# Patient Record
Sex: MALE | Race: OTHER | URBAN - METROPOLITAN AREA
[De-identification: names, ages, dates, MRNs, and addresses within clinical notes are randomized per-mention and may not be internally consistent; named-entity substitution may affect disease eponyms.]

---

## 2019-06-21 ENCOUNTER — EMERGENCY (EMERGENCY)
Facility: HOSPITAL | Age: 39
LOS: 1 days | Discharge: HOME | End: 2019-06-21
Attending: EMERGENCY MEDICINE | Admitting: EMERGENCY MEDICINE
Payer: SELF-PAY

## 2019-06-21 VITALS
HEART RATE: 98 BPM | DIASTOLIC BLOOD PRESSURE: 60 MMHG | RESPIRATION RATE: 16 BRPM | TEMPERATURE: 98 F | SYSTOLIC BLOOD PRESSURE: 133 MMHG | OXYGEN SATURATION: 99 %

## 2019-06-21 VITALS
DIASTOLIC BLOOD PRESSURE: 80 MMHG | SYSTOLIC BLOOD PRESSURE: 112 MMHG | HEART RATE: 92 BPM | OXYGEN SATURATION: 97 % | RESPIRATION RATE: 16 BRPM

## 2019-06-21 LAB
ALBUMIN SERPL ELPH-MCNC: 3.6 G/DL — SIGNIFICANT CHANGE UP (ref 3.3–5)
ALP SERPL-CCNC: 73 U/L — SIGNIFICANT CHANGE UP (ref 40–120)
ALT FLD-CCNC: 30 U/L — SIGNIFICANT CHANGE UP (ref 4–41)
ANION GAP SERPL CALC-SCNC: 15 MMO/L — HIGH (ref 7–14)
APTT BLD: 31.3 SEC — SIGNIFICANT CHANGE UP (ref 27.5–36.3)
AST SERPL-CCNC: 40 U/L — SIGNIFICANT CHANGE UP (ref 4–40)
BASE EXCESS BLDV CALC-SCNC: 6.6 MMOL/L — SIGNIFICANT CHANGE UP
BASOPHILS # BLD AUTO: 0.06 K/UL — SIGNIFICANT CHANGE UP (ref 0–0.2)
BASOPHILS NFR BLD AUTO: 1 % — SIGNIFICANT CHANGE UP (ref 0–2)
BILIRUB SERPL-MCNC: 0.4 MG/DL — SIGNIFICANT CHANGE UP (ref 0.2–1.2)
BLD GP AB SCN SERPL QL: NEGATIVE — SIGNIFICANT CHANGE UP
BLOOD GAS VENOUS - CREATININE: 0.8 MG/DL — SIGNIFICANT CHANGE UP (ref 0.5–1.3)
BUN SERPL-MCNC: 7 MG/DL — SIGNIFICANT CHANGE UP (ref 7–23)
CALCIUM SERPL-MCNC: 8.7 MG/DL — SIGNIFICANT CHANGE UP (ref 8.4–10.5)
CHLORIDE BLDV-SCNC: 109 MMOL/L — HIGH (ref 96–108)
CHLORIDE SERPL-SCNC: 100 MMOL/L — SIGNIFICANT CHANGE UP (ref 98–107)
CO2 SERPL-SCNC: 27 MMOL/L — SIGNIFICANT CHANGE UP (ref 22–31)
CREAT SERPL-MCNC: 0.81 MG/DL — SIGNIFICANT CHANGE UP (ref 0.5–1.3)
EOSINOPHIL # BLD AUTO: 0.07 K/UL — SIGNIFICANT CHANGE UP (ref 0–0.5)
EOSINOPHIL NFR BLD AUTO: 1.1 % — SIGNIFICANT CHANGE UP (ref 0–6)
GAS PNL BLDV: 140 MMOL/L — SIGNIFICANT CHANGE UP (ref 136–146)
GLUCOSE BLDV-MCNC: 88 MG/DL — SIGNIFICANT CHANGE UP (ref 70–99)
GLUCOSE SERPL-MCNC: 88 MG/DL — SIGNIFICANT CHANGE UP (ref 70–99)
HCO3 BLDV-SCNC: 30 MMOL/L — HIGH (ref 20–27)
HCT VFR BLD CALC: 37.7 % — LOW (ref 39–50)
HCT VFR BLDV CALC: 38.9 % — LOW (ref 39–51)
HGB BLD-MCNC: 12.5 G/DL — LOW (ref 13–17)
HGB BLDV-MCNC: 12.6 G/DL — LOW (ref 13–17)
IMM GRANULOCYTES NFR BLD AUTO: 0.5 % — SIGNIFICANT CHANGE UP (ref 0–1.5)
INR BLD: 0.94 — SIGNIFICANT CHANGE UP (ref 0.88–1.17)
LACTATE BLDV-MCNC: 1.6 MMOL/L — SIGNIFICANT CHANGE UP (ref 0.5–2)
LYMPHOCYTES # BLD AUTO: 1.37 K/UL — SIGNIFICANT CHANGE UP (ref 1–3.3)
LYMPHOCYTES # BLD AUTO: 22.1 % — SIGNIFICANT CHANGE UP (ref 13–44)
MCHC RBC-ENTMCNC: 30.7 PG — SIGNIFICANT CHANGE UP (ref 27–34)
MCHC RBC-ENTMCNC: 33.2 % — SIGNIFICANT CHANGE UP (ref 32–36)
MCV RBC AUTO: 92.6 FL — SIGNIFICANT CHANGE UP (ref 80–100)
MONOCYTES # BLD AUTO: 0.79 K/UL — SIGNIFICANT CHANGE UP (ref 0–0.9)
MONOCYTES NFR BLD AUTO: 12.7 % — SIGNIFICANT CHANGE UP (ref 2–14)
NEUTROPHILS # BLD AUTO: 3.89 K/UL — SIGNIFICANT CHANGE UP (ref 1.8–7.4)
NEUTROPHILS NFR BLD AUTO: 62.6 % — SIGNIFICANT CHANGE UP (ref 43–77)
NRBC # FLD: 0 K/UL — SIGNIFICANT CHANGE UP (ref 0–0)
PCO2 BLDV: 47 MMHG — SIGNIFICANT CHANGE UP (ref 41–51)
PH BLDV: 7.43 PH — SIGNIFICANT CHANGE UP (ref 7.32–7.43)
PLATELET # BLD AUTO: 250 K/UL — SIGNIFICANT CHANGE UP (ref 150–400)
PMV BLD: 9.1 FL — SIGNIFICANT CHANGE UP (ref 7–13)
PO2 BLDV: 53 MMHG — HIGH (ref 35–40)
POTASSIUM BLDV-SCNC: 3.4 MMOL/L — SIGNIFICANT CHANGE UP (ref 3.4–4.5)
POTASSIUM SERPL-MCNC: 3.5 MMOL/L — SIGNIFICANT CHANGE UP (ref 3.5–5.3)
POTASSIUM SERPL-SCNC: 3.5 MMOL/L — SIGNIFICANT CHANGE UP (ref 3.5–5.3)
PROT SERPL-MCNC: 7.9 G/DL — SIGNIFICANT CHANGE UP (ref 6–8.3)
PROTHROM AB SERPL-ACNC: 10.4 SEC — SIGNIFICANT CHANGE UP (ref 9.8–13.1)
RBC # BLD: 4.07 M/UL — LOW (ref 4.2–5.8)
RBC # FLD: 14.4 % — SIGNIFICANT CHANGE UP (ref 10.3–14.5)
RH IG SCN BLD-IMP: POSITIVE — SIGNIFICANT CHANGE UP
SAO2 % BLDV: 83.3 % — SIGNIFICANT CHANGE UP (ref 60–85)
SODIUM SERPL-SCNC: 142 MMOL/L — SIGNIFICANT CHANGE UP (ref 135–145)
WBC # BLD: 6.21 K/UL — SIGNIFICANT CHANGE UP (ref 3.8–10.5)
WBC # FLD AUTO: 6.21 K/UL — SIGNIFICANT CHANGE UP (ref 3.8–10.5)

## 2019-06-21 PROCEDURE — 99283 EMERGENCY DEPT VISIT LOW MDM: CPT | Mod: 25

## 2019-06-21 PROCEDURE — 99053 MED SERV 10PM-8AM 24 HR FAC: CPT

## 2019-06-21 RX ORDER — AZTREONAM 2 G
1 VIAL (EA) INJECTION
Qty: 20 | Refills: 0
Start: 2019-06-21 | End: 2019-06-30

## 2019-06-21 RX ADMIN — Medication 50 MILLIGRAM(S): at 07:31

## 2019-06-21 NOTE — ED PROVIDER NOTE - CLINICAL SUMMARY MEDICAL DECISION MAKING FREE TEXT BOX
39M no pmh p/w likely orbital cellulitis. Will upload imaging, preoplabs. ENT/ophtho consult. Already given dose of abx. Admit

## 2019-06-21 NOTE — ED ADULT NURSE NOTE - OBJECTIVE STATEMENT
Pt received in  18, 39Y Male, Aox3, ambulatory, c/o Pt received in rm 18, 39Y Male, Aox3, ambulatory, c/o right eye swelling and pain. Pt received in ambay as a transfer from [facility]. Pt denies any pmhx or medications. Pt reports was outside and got bit by something on his right eye but cannot recall what it was, pt reports possibly a spider?. Pt reports hx of alcohol use. Pt reports drinks daily 2-3 beers and had last drink last night around 2pm. Pt reports no hx of hospitalization for alcohol withdrawal. Upon arrival to room pt refusing to change into hospital gown. Noted edema and redness to right eye. Pt denies any double vision, vision changes. Pt reports wears glasses at home and cannot read eye chart at this time because he does not have his glasses. Pt breathing even and unlabored, medicated as ordered, IV access obtained 20G Lft AC, labs sent as ordered, will continue to monitor. Pt received in rm 18, 39Y Male, Aox3, ambulatory, c/o right eye swelling and pain. Pt received in ambay as a transfer from [facility]. Pt denies any pmhx or medications. Pt reports was outside and got bit by something on his right eye but cannot recall what it was, pt reports possibly a spider?. Pt reports hx of alcohol use. Pt reports drinks daily 2-3 beers and had last drink last night around 2pm. Pt reports no hx of hospitalization for alcohol withdrawal. Upon arrival to room pt refusing to change into hospital gown. Noted IV access by [facility] 20G rt AC. Noted edema and redness to right eye. Pt denies any double vision, vision changes. Pt reports wears glasses at home and cannot read eye chart at this time because he does not have his glasses. Pt breathing even and unlabored, medicated as ordered, IV access obtained 20G Lft AC, labs sent as ordered, will continue to monitor.

## 2019-06-21 NOTE — ED ADULT NURSE REASSESSMENT NOTE - NS ED NURSE REASSESS COMMENT FT1
9:15AM pt walked out the hosp. with 2 IV in place, pt was aware that opthalmology  was called, security was called, pt returned.  IV's was removed.     Tianna Bhat RN

## 2019-06-21 NOTE — CONSULT NOTE ADULT - SUBJECTIVE AND OBJECTIVE BOX
[facility] Ophthalmology Consult Note    HPI: 39M no pmh tx from [facility] for orbital cellulitis accepted by [facility]. Patient reports R eye pain, swelling x 3 days. Thinks he may have been bitten by an insect. Presented to [facility] with proptosis, erythema of R eye and purulent drainage and pain with eye movement. CT orbits done at [facility] and patient given dose of vanc/zosyn    Patient states 4 days ago was [place] in a park and woke up with a bug bite on his right upper lid. Unsure but thinks it may have been a spider bite. Swelling progressed for 2 days but states it is now gettting better.     PMH: None  Meds: None  POcHx (including surgeries/lasers/trauma):  None  Drops: None  FamHx: None  Social Hx: None  Allergies: NKDA    ROS:  General (neg), Vision (per HPI), Head and Neck (neg), Pulm (neg), CV (neg), GI (neg),  (neg), Musculoskeletal (neg), Skin/Integ (neg), Neuro (neg), Endocrine (neg), Heme (neg), All/Immuno (neg)    Mood and Affect Appropriate ( x ),  Oriented to Time, Place, and Person x 3 ( x )    Ophthalmology Exam    Visual acuity (sc): 20/60 Ph 20/25 OD 20/60 Ph 20/25  Pupils: R+R OU, no APD  Ttono: 20 OU  Extraocular movements (EOMs): Full in abduction, adduction and downgaze, 50% upgaze OD Full OS, no pain, no diplopia, no nausea or bradycardia.   Confrontational Visual Field (CVF):  Full OU  Color Plates: 12/12 OU    Pen Light Exam (PLE)  External:  2+ Edema and erythema of periorbital area OD  Lids/Lashes/Lacrimal Ducts: 2+ edema and erythema RUL, RLL OD Flat OS    Sclera/Conjunctiva:  1+ injection, inferior and superior 1+ Chemosis. W+Q OS  Cornea: Cl OU  Anterior Chamber: D+F OU  Iris:  Flat OU  Lens:  Cl OU    Fundus Exam: Patient refused dilated, stating he does not want his vision to be blurry as he has "things to do today". Exam performed undilated.     Vitreous: wnl OU  Disc, cup/disc: sharp and pink, 0.4 OU  Macula:  wnl OU  Vessels:  wnl OU  Periphery: LAURE 2/2 to refusal of dilation    Diagnostic Testing:  CT head:  Pending official read    A/P:  PENDING FINAL ASSESSMENT       40 y/o m with bug bite to right eyelid 4 days ago, progressive eyelid and periorbital swelling. VA excellent, pupils round and reactive no APD, limited EOM OD due to chemosis, no pain or nausea.    -Oral antibiotics per primary team  -Patient can follow up either with Ophthalmology or his PMD   -Strict return precautions given    PENDING FINAL ASSESSMENT     Follow-Up:  Patient should follow up his/her ophthalmologist or in the [facility] Ophthalmology Practice within 1 week of discharge.  [address]  [place], [place] [id number] [phone] [facility] Ophthalmology Consult Note    HPI: 39M no pmh tx from [facility] for orbital cellulitis accepted by [facility]. Patient reports R eye pain, swelling x 3 days. Thinks he may have been bitten by an insect. Presented to [facility] with proptosis, erythema of R eye and purulent drainage and pain with eye movement. CT orbits done at [facility] and patient given dose of vanc/zosyn    Patient states 4 days ago was sleeping in a park and woke up with a bug bite on his right upper lid. Unsure but thinks it may have been a spider bite. Swelling progressed for 2 days but states it is now gettting better.     PMH: None  Meds: None  POcHx (including surgeries/lasers/trauma):  None  Drops: None  FamHx: None  Social Hx: None  Allergies: NKDA    ROS:  General (neg), Vision (per HPI), Head and Neck (neg), Pulm (neg), CV (neg), GI (neg),  (neg), Musculoskeletal (neg), Skin/Integ (neg), Neuro (neg), Endocrine (neg), Heme (neg), All/Immuno (neg)    Mood and Affect Appropriate ( x ),  Oriented to Time, Place, and Person x 3 ( x )    Ophthalmology Exam    Visual acuity (sc): 20/60 Ph 20/25 OD 20/60 Ph 20/25  Pupils: R+R OU, no APD  Ttono: 20 OU  Extraocular movements (EOMs): Full in abduction, adduction and downgaze, 50% upgaze OD Full OS, no pain, no diplopia, no nausea or bradycardia.   Confrontational Visual Field (CVF):  Full OU  Color Plates: 12/12 OU    Pen Light Exam (PLE)  External:  2+ Edema and erythema of periorbital area OD  Lids/Lashes/Lacrimal Ducts: 2+ edema and erythema RUL, RLL OD Flat OS    Sclera/Conjunctiva:  1+ injection, inferior and superior 1+ Chemosis. W+Q OS  Cornea: Cl OU  Anterior Chamber: D+F OU  Iris:  Flat OU  Lens:  Cl OU    Fundus Exam: Patient refused dilated, stating he does not want his vision to be blurry as he has "things to do today". Exam performed undilated.     Vitreous: wnl OU  Disc, cup/disc: sharp and pink, 0.4 OU  Macula:  wnl OU  Vessels:  wnl OU  Periphery: LAURE 2/2 to refusal of dilation    Diagnostic Testing:  CT head:  Pending official read  Subperiosteal process going on supermedially in the orbit, possibly abscess. Cannot r/o abscess without contrast.   Frontal sinusitis.   Reviewed with Dr. Cosme    A/P:  PENDING FINAL ASSESSMENT       40 y/o m with bug bite to right eyelid 4 days ago, progressive eyelid and periorbital swelling. VA excellent, pupils round and reactive no APD, limited EOM OD Infectious process of sinus and orbit, possible abscess. Imaging reviewed with Radiologist Dr. Cosme.   -      Discussed with Dr. Ruiz (oculoplastics)     Follow-Up:  Patient should follow up his/her ophthalmologist or in the [facility] Ophthalmology Practice within 1 week of discharge.  [address]  [place], [place] [id number] [phone] [facility] Ophthalmology Consult Note    HPI: 39M no pmh tx from [facility] for orbital cellulitis accepted by [facility]. Patient reports R eye pain, swelling x 3 days. Thinks he may have been bitten by an insect. Presented to [facility] with proptosis, erythema of R eye and purulent drainage and pain with eye movement. CT orbits done at [facility] and patient given dose of vanc/zosyn    Patient states 4 days ago was sleeping in a park and woke up with a bug bite on his right upper lid. Unsure but thinks it may have been a spider bite. Swelling progressed for 2 days but states it is now gettting better. Denies headache, change in vision.     PMH: ETOH abuse  Meds: None  POcHx (including surgeries/lasers/trauma):  Wears glasses  Drops: None  FamHx: None  Social Hx: None  Allergies: NKDA    ROS:  General (neg), Vision (per HPI), Head and Neck (neg), Pulm (neg), CV (neg), GI (neg),  (neg), Musculoskeletal (neg), Skin/Integ (neg), Neuro (neg), Endocrine (neg), Heme (neg), All/Immuno (neg)    Mood and Affect Appropriate ( x ),  Oriented to Time, Place, and Person x 3 ( x )    Ophthalmology Exam    Visual acuity (sc): 20/60 Ph 20/25 OD 20/60 Ph 20/25  Pupils: R+R OU, no APD  Ttono: 20 OU  Extraocular movements (EOMs): Full in abduction, adduction and downgaze, 50% upgaze OD Full OS, no pain, no diplopia, no nausea or bradycardia.   Confrontational Visual Field (CVF):  Full OU  Color Plates: 12/12 OU    Pen Light Exam (PLE)  External:  2+ Edema and erythema of periorbital area OD  Lids/Lashes/Lacrimal Ducts: 2+ edema and erythema RUL, RLL OD Flat OS    Sclera/Conjunctiva:  1+ injection, inferior and superior 1+ Chemosis. W+Q OS  Cornea: Cl OU  Anterior Chamber: D+F OU  Iris:  Flat OU  Lens:  Cl OU    Fundus Exam: Patient refused dilated, stating he does not want his vision to be blurry as he has "things to do today". Exam performed undilated.     Vitreous: wnl OU  Disc, cup/disc: sharp and pink, 0.4 OU  Macula:  wnl OU  Vessels:  wnl OU  Periphery: LAURE 2/2 to refusal of dilation    Diagnostic Testing:  CT head:  Pending official read  Subperiosteal process going on supermedially in the orbit, possibly abscess. Cannot r/o abscess without contrast.   Frontal sinusitis.   Reviewed with Dr. Cosme    A/P:  40 y/o m with bug bite to right eyelid 4 days ago, progressive eyelid and periorbital swelling. VA excellent, pupils round and reactive no APD, limited EOM OD Infectious process of sinus and orbit, possible abscess with frontal sinusitis. Imaging reviewed with Radiologist Dr. Cosme. Infectious process potentially vision and life threatening.  Informed by ED staff that patient left ED AMA,  -Rec immediate admission with IV antibiotics, ID consult  -Patient may need abscess drainage   -Explained severity to patient, patient states he understands the risks and wants to leave AMA  -Discussed case with ED attending Dr. Etienne Hawthorne  -Please reconsult Ophthalmology if patient returns to hospital     Discussed with Dr. Ruiz (oculoplastics)

## 2019-06-21 NOTE — ED PROVIDER NOTE - PROGRESS NOTE DETAILS
Lucina: Patient received librium for agitation. States that he wants to leave but willing to stay to speak to ophtho. Lucina: Ophtho came by but unable to find patient. Will call when patient comes back. Lucina: Patient seen by ophtho resident. Will discuss with attending. Called radiology for read for CT from [facility]. Lucina: The patient has decided to leave against medical advice.  The patient is AAOx3, not intoxicated, and displays normal decision making ability. We discussed all risks, benefits, and alternatives to the progression of treatment and the potential dangers of leaving including but not limited to permanent disability, injury, and death.  The patient was instructed that they are welcome to change their decision to leave against medical advice and return to the emergency department at any time and for any reason in order to allow us to render care. Lucina: The patient has decided to leave against medical advice.  The patient is AAOx3, not intoxicated, and displays normal decision making ability. We discussed all risks, benefits, and alternatives to the progression of treatment and the potential dangers of leaving including but not limited to permanent disability (blindness), injury, and death (from sepsis 2/2 infection).  The patient was instructed that they are welcome to change their decision to leave against medical advice and return to the emergency department at any time and for any reason in order to allow us to render care. He was able to verbalize the risks of leaving and decided to leave anyway. Refuses to stay to be seen by ENT and for formal read from radiology. Antibiotics were sent to his pharmacy.

## 2019-06-21 NOTE — ED ADULT NURSE REASSESSMENT NOTE - NS ED NURSE REASSESS COMMENT FT1
ER pt awaiting for opth. for his right eye lid possible cellulitis, area swelling, erythema to eye lid with some secretions noted, pt denies blur vision, pt is ambulatory, afebrile, stating he was probable bitten by an insect left 3 days ago with some insect removed from his fore head.  pt appear homeless stated he is from [place], pt has 2 IV right and left arm.  pt stated do not want to wait, wants AMA.  Tianna Bhat RN

## 2019-06-21 NOTE — ED PROVIDER NOTE - ATTENDING CONTRIBUTION TO CARE
40 y/o M with h/o ETOH abuse (daily drinker, last drink at 2am, no h/o withdrawal) transferred from [facility] for right orbital cellulitis.  Pt reports 3 days of pain, swelling to right eye - he thinks that he was bitten by something.  No fever, chills, vision changes.  Pt is near sighted, wears glasses, no contacts (does not have glasses with him currently).  Pt denies vision changes, reports pain with EOM.  Pt received IV vancomycin and zosyn at [facility] prior to transfer.  Pt is lying comfortably in stretcher, awake and alert, nontoxic.  AF/VSS.  (+)R periorbital redness and swelling, mild R eye proptosis, (+)purulent drainage from eye, pupils round reactive to light but sluggish, equal bilaterally.  EOMI but with mild pain.  (+)mild R eye chemosis and injection.  Lungs cta bl.  Cards nl S1/S2, RRR, no MRG.  Abd soft ntnd.  Plan for ophtho consult, librium in setting of chronic etoh use, likely admit to cont IV abx.

## 2019-06-21 NOTE — ED PROVIDER NOTE - OBJECTIVE STATEMENT
39M no pmh tx from [facility] for orbital cellulitis accepted by [facility]. Patient reports R eye pain, swelling x 3 days. Thinks he may have been bitten by an insect. Presented to [facility] with proptosis, erythema of R eye and purulent drainage and pain with eye movement. CT orbits done at [facility] and patient given dose of vanc/zosyn

## 2019-06-21 NOTE — ED PROVIDER NOTE - NSFOLLOWUPINSTRUCTIONS_ED_ALL_ED_FT
You were seen today for pain and redness around your eye. We are very concerned for an infection there. You were seen by ophthalmologist, but you do not want to stay for further testing/treatment. Please take the antibiotics as prescribed every 12 hours.  Please follow up with ophthalmology within a week of discharge:    [address] [place] [address]. [address]  [place] [id number] [phone]    If you develop worsening vision, worsening swelling, high fevers, chills, or any other concerning symptoms please come back to the closest ED.

## 2019-06-21 NOTE — ED PROVIDER NOTE - NEUROLOGICAL, MLM
Alert and oriented, no focal deficits, no motor or sensory deficits. cn2-12 grossly intact, normal speech and tone.

## 2019-06-21 NOTE — SBIRT NOTE ADULT - NSSBIRTALCPASSREFTXDET_GEN_A_CORE
Provided SBIRT services: Full screen positive. Referral to Treatment Performed. Screening results were reviewed with the patient and patient was provided information about healthy guidelines and potential negative consequences associated with level of risk. Motivation and readiness to reduce or stop use was discussed and goals and activities to make changes were suggested/offered.  Pt not interested in going directly to treatment at this time. Pt provided with outpatient referrals to [facility] [place]- [phone]; [facility]  [phone]; Detox at [facility] [phone] and [facility] at [place] in [place] [phone]

## 2019-06-21 NOTE — ED ADULT TRIAGE NOTE - CHIEF COMPLAINT QUOTE
Pt brought in from [facility] as transfer accepted by MD [facility] for facial abscess. Per EMS pt reports that he was bit by a bug. Pt asleep in triage, aroused when name is called, also ETOH abuser, currently intox. Pt appears in no acute distress, vs as noted. Pt arrives with 20G to RAC.

## 2019-06-21 NOTE — ED ADULT NURSE NOTE - CHIEF COMPLAINT QUOTE
Pt brought in from [facility] as transfer accepted by [facility] for facial abscess. Per EMS pt reports that he was bit by a bug. Pt asleep in triage, aroused when name is called, also ETOH abuser, currently intox. Pt appears in no acute distress, vs as noted. Pt arrives with 20G to RAC.

## 2019-06-21 NOTE — ED PROVIDER NOTE - EYES, MLM
Pupils equal round, reactive to light. EOMI but patient reports pain with eye movement. +Ecchymosis to R orbit with eyelid swelling. +chemosis and proptosis of R eye with injection.

## 2021-06-01 ENCOUNTER — OUTPATIENT (OUTPATIENT)
Dept: OUTPATIENT SERVICES | Facility: HOSPITAL | Age: 41
LOS: 1 days | End: 2021-06-01
Payer: MEDICAID

## 2021-06-10 DIAGNOSIS — Z71.89 OTHER SPECIFIED COUNSELING: ICD-10-CM

## 2021-06-10 PROBLEM — Z78.9 OTHER SPECIFIED HEALTH STATUS: Chronic | Status: ACTIVE | Noted: 2019-06-21

## 2021-12-01 PROCEDURE — G9005: CPT

## 2022-06-16 ENCOUNTER — EMERGENCY (EMERGENCY)
Facility: HOSPITAL | Age: 42
LOS: 1 days | Discharge: HOME | End: 2022-06-16
Attending: EMERGENCY MEDICINE
Payer: COMMERCIAL

## 2022-06-16 VITALS
SYSTOLIC BLOOD PRESSURE: 120 MMHG | DIASTOLIC BLOOD PRESSURE: 80 MMHG | OXYGEN SATURATION: 97 % | HEART RATE: 81 BPM | RESPIRATION RATE: 16 BRPM

## 2022-06-16 VITALS
TEMPERATURE: 98 F | OXYGEN SATURATION: 97 % | HEART RATE: 87 BPM | DIASTOLIC BLOOD PRESSURE: 82 MMHG | SYSTOLIC BLOOD PRESSURE: 126 MMHG | RESPIRATION RATE: 18 BRPM

## 2022-06-16 PROCEDURE — 29125 APPL SHORT ARM SPLINT STATIC: CPT

## 2022-06-16 PROCEDURE — 99284 EMERGENCY DEPT VISIT MOD MDM: CPT | Mod: 25

## 2022-06-16 PROCEDURE — 73130 X-RAY EXAM OF HAND: CPT

## 2022-06-16 PROCEDURE — 73130 X-RAY EXAM OF HAND: CPT | Mod: 26,RT

## 2022-06-16 PROCEDURE — 29105 APPLICATION LONG ARM SPLINT: CPT | Mod: RT

## 2022-06-16 PROCEDURE — 99283 EMERGENCY DEPT VISIT LOW MDM: CPT | Mod: 25

## 2022-06-16 RX ORDER — IBUPROFEN 200 MG
600 TABLET ORAL ONCE
Refills: 0 | Status: COMPLETED | OUTPATIENT
Start: 2022-06-16 | End: 2022-06-16

## 2022-06-16 RX ADMIN — Medication 600 MILLIGRAM(S): at 03:15

## 2022-06-16 NOTE — ED PROVIDER NOTE - PHYSICAL EXAMINATION
ORtho- right hand with moderate swelling, FROM , sensation intact, compartments soft, tender to 5th metacarpal,

## 2022-06-16 NOTE — ED ADULT NURSE NOTE - OBJECTIVE STATEMENT
Pt is a 42YOM who came into the Emergency Department for evaluation of his right 5th finger, pt states he was playing [place] ball when he injured his hand, pt states that he did nothing out of the ordinary, pt states he struck the ball and felt a "twinge", pt states that he is in a detox program and is close to leaving, states that [facility] did an xray of his right hand and said that the finger is fractured. Pt is here for splinting.

## 2022-06-16 NOTE — ED PROVIDER NOTE - NS ED ATTENDING STATEMENT MOD
This was a shared visit with the ALEM. I reviewed and verified the documentation and independently performed the documented:

## 2022-06-16 NOTE — ED PROVIDER NOTE - OBJECTIVE STATEMENT
43 y/o male with no sign medical history presents to the ED c/o right hand pain. Notes sent from [facility] for fracture of rgiht hand after his right hand hyperextended while playing volleyball. No pain prior to arrival. Denies numbness, tingling, coldness.

## 2022-06-16 NOTE — ED PROVIDER NOTE - CLINICAL SUMMARY MEDICAL DECISION MAKING FREE TEXT BOX
right hand pain after playing volleyball, nt to wrist, swelling and pain tot he right lateral hand, will obtain xray, meds

## 2022-06-16 NOTE — ED ADULT NURSE NOTE - NSIMPLEMENTINTERV_GEN_ALL_ED
Implemented All Universal Safety Interventions:  [place] to call system. Call bell, personal items and telephone within reach. Instruct patient to call for assistance. Room bathroom lighting operational. Non-slip footwear when patient is off stretcher. Physically safe environment: no spills, clutter or unnecessary equipment. Stretcher in lowest position, wheels locked, appropriate side rails in place.

## 2022-06-16 NOTE — ED PROVIDER NOTE - PATIENT PORTAL LINK FT
You can access the FollowMyHealth Patient Portal offered by [facility] by registering at the following website: [email or web address]. By joining [employer]’s FollowMyHealth portal, you will also be able to view your health information using other applications (apps) compatible with our system.

## 2022-06-16 NOTE — ED ADULT NURSE NOTE - CHIEF COMPLAINT QUOTE
[facility] from [facility] (in facility of alcohol detox and completes his program Saturday) s/p playing Volleyball and fracturing his R hand as seen on XR. Patient has decreased ROM but + sensation. Sent for casting. Has not been medicated for pain.

## 2022-06-16 NOTE — ED ADULT NURSE REASSESSMENT NOTE - NS ED NURSE REASSESS COMMENT FT1
Pt is A&OX4, being discharged back to [facility] with ortho follow up, pt educated on discharge care and medication modalities, rest, ice, compression and elevation, pt states understanding of education, proficiency determined successful by teach back demonstration. EMS crew present at discharge for transport. VS recorded and placed in the EMR.

## 2022-06-16 NOTE — ED ADULT TRIAGE NOTE - CHIEF COMPLAINT QUOTE
[facility] from [facility] (in facility of alcohol detox and completes his program Saturday) s/p playing Volleyball and fracturing his R hand as seen on XR. Patient has decreased ROM but + sensation. Sent for casting. Has not been medicated for pain.

## 2022-06-16 NOTE — ED PROVIDER NOTE - CARE PROVIDER_API CALL
Diego Nelson)  Plastic Surgery  [address] [place] [address]  Phone: [phone]  Fax: [phone]  Follow Up Time:

## 2022-06-16 NOTE — ED PROVIDER NOTE - NSFOLLOWUPINSTRUCTIONS_ED_ALL_ED_FT
Fracture    A fracture is a break in one of your bones. This can occur from a variety of injuries, especially traumatic ones. Symptoms include pain, bruising, or swelling. Do not use the injured limb. If a fracture is in one of the bones below your waist, do not put weight on that limb unless instructed to do so by your healthcare provider. Crutches or a cane may have been provided. A splint or cast may have been applied by your health care provider. Make sure to keep it dry and follow up with an orthopedist as instructed.    SEEK IMMEDIATE MEDICAL CARE IF YOU HAVE ANY OF THE FOLLOWING SYMPTOMS: numbness, tingling, increasing pain, or weakness in any part of the injured limb.        Procedural Sedation    During your Emergency Department visit, you might have undergone procedural sedation. If you were, you were given medication to help relax you and alleviate pain to aid in a diagnostic or therapeutic procedure. The medication given is typically short-acting but may have some lingering effects for up to 48 hours. Do not be alone - have a responsible adult stay with you until you are awake and alert. Do not drive or operate heavy machinery for the next 24 hours. Do not drink alcohol or smoke or take medicines that cause drowsiness. Do not make important decisions or sign legal documents or take care of children on your own.    SEEK IMMEDIATE MEDICAL CARE IF YOU HAVE ANY OF THE FOLLOWING SYMPTOMS: trouble breathing, confusion, inability to urinate, severe pain, rash, lightheadedness/dizziness, or fainting.
